# Patient Record
Sex: MALE | URBAN - METROPOLITAN AREA
[De-identification: names, ages, dates, MRNs, and addresses within clinical notes are randomized per-mention and may not be internally consistent; named-entity substitution may affect disease eponyms.]

---

## 2024-01-01 ENCOUNTER — NURSE TRIAGE (OUTPATIENT)
Dept: NURSING | Facility: CLINIC | Age: 29
End: 2024-01-01

## 2024-01-02 NOTE — TELEPHONE ENCOUNTER
Patient reporting eye swelling starting above right eye after taking Ibuprofen two hours ago and then swelling underneath left eye.  Took Benadryl 20 min ago.  So far no improvement in symptoms, or maybe slightly.      Denies injury, but few weeks ago took Ibuprofen and noticed right eye getting irritated and mildly swollen.  It spontaneously recovered.      Denies breathing/swallowing problems, rash, chest/throat tightness.    Disposition is to home care. If symptoms noticeably improve in the next 1-2 hours, he will want to be seen in the next couple days to rule out allergy.  Patient verbalizes understanding and will watch for worsening symptoms.    Brittanie Sheth RN  [facility]          Reason for Disposition   Eyelid swelling from suspected mild irritant    Additional Information   Negative: Unresponsive, passed out or very weak   Negative: Difficulty breathing or wheezing   Negative: [1] Difficulty swallowing or slurred speech AND [2] sudden onset   Negative: Sounds like a life-threatening emergency to the triager   Negative: Chemical got in the eye   Negative: Recent injury to the eye   Negative: Entire face is swollen   Negative: [1] SEVERE eyelid swelling (i.e., shut or almost) AND [2] fever   Negative: [1] Eyelid (outer) is very red AND [2] fever   Negative: Patient sounds very sick or weak to the triager   Negative: [1] Life-threatening reaction in the past to similar substance (e.g., food, insect bite/sting, medication, etc.) AND [2] < 2 hours since exposure   Negative: Wheezing, stridor, hoarseness, or difficulty breathing   Negative: [1] Tightness in the chest or throat AND [2] begins within 2 hours of exposure to allergic substance   Negative: Difficulty swallowing, drooling or slurred speech   Negative: Difficult to awaken or acting confused (e.g., disoriented, slurred speech)   Negative: Unresponsive, passed out or very weak   Negative: Other symptom of severe allergic reaction   "(Exception: Hives or facial swelling alone.)   Negative: Sounds like a life-threatening emergency to the triager   Negative: [1] Widespread hives AND [2] onset > 2 hours after exposure to high-risk allergen (e.g., sting, nuts, 1st dose of antibiotic)   Negative: [1] Widespread itching AND [2] onset > 2 hours after exposure to high-risk allergen (e.g., sting, nuts, 1st dose of antibiotic)   Negative: [1] Face swelling AND [2] onset > 2 hours after exposure to high-risk allergen (e.g., sting, nuts, 1st dose of antibiotic)   Negative: [1] Widespread hives, itching or facial swelling AND [2] onset < 2 hours of exposure to high-risk allergen (e.g., sting, nuts, 1st dose of antibiotic)   Negative: [1] Vomiting or abdominal cramps AND [2] onset < 2 hours of exposure to high-risk allergen (e.g., sting, nuts, 1st dose of antibiotic)   Negative: [1] Had epinephrine shot AND [2] no symptoms now   Negative: [1] Used asthma inhaler or neb AND [2] no symptoms now   Negative: [1] Pregnant 20 or more weeks AND [2] sudden weight gain (e.g., more than 3 lbs or 1.4 kg in one week)   Negative: [1] Postpartum (from 0 to 6 weeks after delivery) AND [2] sudden weight gain (e.g., more than 3 lbs or 1.4 kg in one week)   Negative: [1] SEVERE eyelid swelling (i.e., shut or almost) AND [2] involves both eyes (Exception: Itchy eyes, which  are probably an allergic reaction.)   Negative: [1] SEVERE eyelid swelling AND [2] Taking an ACE Inhibitor medicine (e.g., benazepril / LOTENSIN, captopril / CAPOTEN, enalapril / VASOTEC, lisinopril / ZESTRIL)   Negative: [1] SEVERE eyelid swelling on one side AND [2] red and painful (or tender to touch)   Negative: [1] SEVERE eyelid swelling on one side AND [2] sinus pain or pressure   Negative: Fever   Negative: [1] Painful rash AND [2] multiple small blisters grouped together (i.e., dermatomal distribution or \"band\" or \"stripe\")   Negative: [1] SEVERE eyelid swelling (i.e., shut or almost) AND [2] " involves both eyes AND [3] itchy   Negative: MODERATE-SEVERE eyelid swelling on one side  (Exception: Due to a mosquito bite.)   Negative: Eyelid is red and painful (or tender to touch)   Negative: [1] MILD eyelid swelling (puffiness) AND [2] sinus pain or pressure   Negative: Swelling of both lower legs (i.e., bilateral pedal edema)   Negative: [1] MILD eyelid swelling (puffiness) AND [2] persists > 3 days  (Exception: Suspect mosquito bites.)   Negative: Eyelid swelling is a chronic problem (recurrent or ongoing AND present > 4 weeks)   Negative: Eyelid swelling began after use of a new facial cosmetic, hair care product or nail polish   Negative: [1] Took antihistamine (e.g., Benadryl) by mouth AND [2] no symptoms now     Took Benadryl 20 minutes ago.  Symptoms NOT gone, but improving slightly.    Protocols used: Eye - Swelling-A-, [id number]